# Patient Record
(demographics unavailable — no encounter records)

---

## 2025-02-14 NOTE — DISCUSSION/SUMMARY
[EKG obtained to assist in diagnosis and management of assessed problem(s)] : EKG obtained to assist in diagnosis and management of assessed problem(s) [FreeTextEntry1] : stable exam/ chart and hospital records reviewed CAD h/o CABG, stable no active angina CMP/CHF stable, euvolemic, mild decreased LVEF Afib stable chronic s/p PPM and watchman AS, s/p MVR- f/u echo eval CKD- renal evaluation

## 2025-02-14 NOTE — PHYSICAL EXAM
[Well Developed] : well developed [Well Nourished] : well nourished [No Acute Distress] : no acute distress [Normal Conjunctiva] : normal conjunctiva [Normal Venous Pressure] : normal venous pressure [No Carotid Bruit] : no carotid bruit [Normal S1, S2] : normal S1, S2 [No Rub] : no rub [No Gallop] : no gallop [Murmur] : murmur [Clear Lung Fields] : clear lung fields [Good Air Entry] : good air entry [No Respiratory Distress] : no respiratory distress  [Soft] : abdomen soft [Non Tender] : non-tender [No Masses/organomegaly] : no masses/organomegaly [Normal Bowel Sounds] : normal bowel sounds [Normal Gait] : normal gait [No Cyanosis] : no cyanosis [No Clubbing] : no clubbing [Venous stasis] : venous stasis [No Rash] : no rash [No Skin Lesions] : no skin lesions [Moves all extremities] : moves all extremities [No Focal Deficits] : no focal deficits [Normal Speech] : normal speech [Alert and Oriented] : alert and oriented [Normal memory] : normal memory [de-identified] : 2/6 [de-identified] : trace

## 2025-02-14 NOTE — HISTORY OF PRESENT ILLNESS
[FreeTextEntry1] : f/u post hospitalization at both [facility] and [facility] for chf exacerbation current in [place]

## 2025-03-06 NOTE — PHYSICAL EXAM
[Abdomen Tenderness] : non-tender [Abdomen Soft] : soft [de-identified] : chronically ill appearing, cachectic, sitting in wheelchair

## 2025-03-06 NOTE — ASSESSMENT
[FreeTextEntry1] : 90 yo male (DNR/DNI) w/ hx of CAD, HFrEF, pacemaker, CKD, DM2, AF (not on AC), HLD, aortic stenosis who presents for a second opinion for reported gastric cancer:  #Gastric cancer - Patient w/ reported gastric cancer that was recently found on EGD at [facility], but patient does not have records at this time - Advised patient and patient's son that we need to obtain all of the hospital records: endoscopy, pathology, labs, imaging, and any GI and surgery notes - Phone visit in 2 weeks - Will discuss next steps following review of [facility] records. Patient has multiple severe medical comorbidities and currently is DNR/DNI so will have to carefully weigh risk/benefits of any possible interventions   Case discussed w/ GI attending Dr. Goran Patten MD PGY-4 Gastroenterology Fellow

## 2025-03-06 NOTE — PHYSICAL EXAM
[Abdomen Tenderness] : non-tender [Abdomen Soft] : soft [de-identified] : chronically ill appearing, cachectic, sitting in wheelchair

## 2025-03-06 NOTE — ASSESSMENT
[FreeTextEntry1] : 90 yo male (DNR/DNI) w/ hx of CAD, HFrEF, pacemaker, CKD, DM2, AF (not on AC), HLD, aortic stenosis who presents for a second opinion for reported gastric cancer:  #Gastric cancer - Patient w/ reported gastric cancer that was recently found on EGD at [facility], but patient does not have records at this time - Advised patient and patient's son that we need to obtain all of the hospital records: endoscopy, pathology, labs, imaging, and any GI and surgery notes - Phone visit in 2 weeks - Will discuss next steps following review of [facility] records. Patient has multiple severe medical comorbidities and currently is DNR/DNI so will have to carefully weigh risk/benefits of any possible interventions   Case discussed w/ GI attending Dr. Goran Patten MD PGY-4 Gastroenterology Fellow

## 2025-03-06 NOTE — HISTORY OF PRESENT ILLNESS
[FreeTextEntry1] : 88 yo male (DNR/DNI) w/ hx of CAD, HFrEF, pacemaker, CKD, DM2, AF (not on AC), HLD, aortic stenosis who presents for a second opinion for reported gastric cancer:  Patient unable to give a full history so most of history obtained from patient's son Bandar Santoro. Patient was living independently in [place] this October when he was found down and was brought to the local hospital and found to have a heart attack that was thought to be 2/2 anemia. No endoscopy done at that time. He was sent to rehab and has been in various rehabs since then. Recently his rehab checked his routine labs and was found to have a Hgb of 5 so was sent to [facility] where an EGD was done and found to have a gastric cancer that was reportedly inoperable, and patient is now presenting for a second opinion.

## 2025-03-06 NOTE — HISTORY OF PRESENT ILLNESS
[FreeTextEntry1] : 88 yo male (DNR/DNI) w/ hx of CAD, HFrEF, pacemaker, CKD, DM2, AF (not on AC), HLD, aortic stenosis who presents for a second opinion for reported gastric cancer:  Patient unable to give a full history so most of history obtained from patient's son Bandar Santoro. Patient was living independently in [place] this October when he was found down and was brought to the local hospital and found to have a heart attack that was thought to be 2/2 anemia. No endoscopy done at that time. He was sent to rehab and has been in various rehabs since then. Recently his rehab checked his routine labs and was found to have a Hgb of 5 so was sent to [facility] where an EGD was done and found to have a gastric cancer that was reportedly inoperable, and patient is now presenting for a second opinion.

## 2025-03-06 NOTE — END OF VISIT
[] : Fellow [FreeTextEntry3] : 90yo M (DNR/DNI) with PMH of CAD, HFrEF, pacemaker, CKD, DM2, AF (not on AC), HLD, aortic stenosis who presents for a second opinion for reported gastric cancer.   Patient with reported MI 2/2 anemia which was ultimately found to be due to gastric cancer per patient's son. Records not available for review during this visit. In order to determine whether surgery is a viable option vs systemic therapy, will need to review records. May also refer to medical oncology and/or surgery, however records will still be required. The son will obtain records for our review to discuss next steps.

## 2025-04-01 NOTE — HISTORY OF PRESENT ILLNESS
[FreeTextEntry1] : Pt is a 89-year-old male with history of CHF, CAD (s/p CABG w/ MV replacement in 2012, 2 stents prior), PPM (2012 post-CABG), DM, HTN, HLD, CKD, Atrial Fibrillation s/p Watchman (not on AC) who came to the clinic for the initial evaluation of CKD.  Pt is currently at [facility].   Pt says he is aware that he has CKD and was following with a nephrologist (name unclear) in [place], [place] for years. Per Pt, His Scr was ranging ~1.9-2.5. Pt was recently admitted at [facility] and then at [facility] (2/4/25-2/7/25) for CHF exacerbation. Pt received IV diuretics and was discharged on Lasix 40 mg (MWF). Also, Spironolactone and Losartan were discontinued in view of hyperkalemia (serum potassium elevated at 6.1). Last Scr was elevated/stable at 2.40 on 3/11/25. Last serum potassium was elevated at 5.4 on 3/11/25. UA done on 2/14/25 showed trace proteins.  Pt. Denies history of kidney stones in the past. No history of kidney disease in family. Denies recent use of NSAIDs/ motrin recently.   Pt. currently feels well. Pt. endorses SOB on exertion. He says he discontinued Lasix by himself. Denies any chest pain, nausea, dysuria, weakness.

## 2025-04-01 NOTE — PHYSICAL EXAM
[General Appearance - Alert] : alert [Sclera] : the sclera and conjunctiva were normal [Outer Ear] : the ears and nose were normal in appearance [Jugular Venous Distention Increased] : there was no jugular-venous distention [Auscultation Breath Sounds / Voice Sounds] : lungs were clear to auscultation bilaterally [Heart Sounds] : normal S1 and S2 [Abdomen Soft] : soft [No CVA Tenderness] : no ~M costovertebral angle tenderness [Nail Clubbing] : no clubbing  or cyanosis of the fingernails [] : no rash [No Focal Deficits] : no focal deficits [Affect] : the affect was normal [Mood] : the mood was normal [FreeTextEntry1] : B/L LE mild pitting edema +

## 2025-04-01 NOTE — REVIEW OF SYSTEMS
[As Noted in HPI] : as noted in HPI [Fever] : no fever [Feeling Poorly] : not feeling poorly [Dry Eyes] : no dryness of the eyes [Sore Throat] : no sore throat [Chest Pain] : no chest pain [Abdominal Pain] : no abdominal pain [Dysuria] : no dysuria [Limb Pain] : no limb pain [Itching] : no itching [Dizziness] : no dizziness [Anxiety] : no anxiety [Muscle Weakness] : no muscle weakness [FreeTextEntry2] : Ambulates with wheelchair assistacne [FreeTextEntry5] : B/L LE edema reported.

## 2025-04-01 NOTE — ASSESSMENT
[FreeTextEntry1] : 1.CKD stage4: Pt with history of CKD. Likely in the setting of longstanding DM, HTN and extensive cardiac history. He  was following with a nephrologist (name unclear) in [place], [place] for years. Per Pt, His Scr was ranging ~1.9-2.5. Last Scr was elevated/stable at 2.40 on 3/11/25. UA done on 2/14/25 showed trace proteins. Importance of good glycemic and BP control reviewed with patient. Continue with Losartan for HTN and anti proteinuric effect.  Check CBC, renal panel, UA, UPCR, and serum intact PTH level today. Advised patient to avoid NSAIDs, RCAs, and other nephrotoxins. Monitor renal function.  2.HTN: Repeat BP reading in acceptable range during office visit. Continue to monitor BP on current BP meds. Low salt diet advised.  3.Leg edema: Pt. with bilateral LE pitting edema on exam today. Advised to resume Lasix 40 mg on MWF as per cardiology. Low salt diet advised. Monitor daily weights.  4.Metabolic acidosis: Recent serum bicarbonate level was low at 17 on 3/11/25. Advised to start taking Oral sodium bicarbonate therapy 650 gm TID. Monitor serum bicarbonate level.   5.Hyperkalemia: Recent serum potassium is mildly elevated at 5.4 on 3/11/25. Will check serum potassium level today. Low potassium diet advised.   Follow up pending lab results.   60 minutes: spent in obtaining and reviewing previous records, performing the visit, counseling/coordination of care, creating orders, and documenting the encounter.

## 2025-04-03 NOTE — PHYSICAL EXAM
[Well Developed] : well developed [Well Nourished] : well nourished [No Acute Distress] : no acute distress [Normal Conjunctiva] : normal conjunctiva [Normal Venous Pressure] : normal venous pressure [No Carotid Bruit] : no carotid bruit [Normal S1, S2] : normal S1, S2 [No Rub] : no rub [No Gallop] : no gallop [Murmur] : murmur [Clear Lung Fields] : clear lung fields [Good Air Entry] : good air entry [No Respiratory Distress] : no respiratory distress  [Soft] : abdomen soft [Non Tender] : non-tender [No Masses/organomegaly] : no masses/organomegaly [Normal Bowel Sounds] : normal bowel sounds [Normal Gait] : normal gait [No Cyanosis] : no cyanosis [No Clubbing] : no clubbing [Venous stasis] : venous stasis [No Rash] : no rash [No Skin Lesions] : no skin lesions [Moves all extremities] : moves all extremities [No Focal Deficits] : no focal deficits [Normal Speech] : normal speech [Alert and Oriented] : alert and oriented [Normal memory] : normal memory [de-identified] : 2/6 [de-identified] : trace

## 2025-04-03 NOTE — DISCUSSION/SUMMARY
[FreeTextEntry1] : stable exam CAD stable, h/o CABG, no active angina CMP stable, no active chf, cont lasix afib stable, s/p PPM/watchman HTN stable Lipids stable AS moderate s/p MVR normal function